# Patient Record
Sex: FEMALE | Race: WHITE | URBAN - NONMETROPOLITAN AREA
[De-identification: names, ages, dates, MRNs, and addresses within clinical notes are randomized per-mention and may not be internally consistent; named-entity substitution may affect disease eponyms.]

---

## 2021-04-05 ENCOUNTER — OFFICE VISIT (OUTPATIENT)
Dept: URBAN - NONMETROPOLITAN AREA CLINIC 10 | Facility: CLINIC | Age: 51
End: 2021-04-05
Payer: COMMERCIAL

## 2021-04-05 DIAGNOSIS — H43.813 VITREOUS DEGENERATION, BILATERAL: Primary | ICD-10-CM

## 2021-04-05 DIAGNOSIS — H04.123 DRY EYE SYNDROME OF BILATERAL LACRIMAL GLANDS: ICD-10-CM

## 2021-04-05 DIAGNOSIS — H52.4 PRESBYOPIA: ICD-10-CM

## 2021-04-05 DIAGNOSIS — H40.013 OPEN ANGLE WITH BORDERLINE FINDINGS, LOW RISK, BILATERAL: ICD-10-CM

## 2021-04-05 PROCEDURE — 92004 COMPRE OPH EXAM NEW PT 1/>: CPT | Performed by: OPTOMETRIST

## 2021-04-05 ASSESSMENT — VISUAL ACUITY
OS: 20/30
OD: 20/30

## 2021-04-05 ASSESSMENT — INTRAOCULAR PRESSURE
OS: 17
OD: 15

## 2021-04-05 NOTE — IMPRESSION/PLAN
Impression: Diagnosis: Dry eye syndrome of bilateral lacrimal glands. Code: N41.728. Plan: Dry eyes account for the patient's complaints. There is no evidence of permanent changes to the cornea. Explained condition does not have a cure and will need artificial tears for maintenance. Recommend Systane Complete.

## 2021-04-05 NOTE — IMPRESSION/PLAN
Impression: Presbyopia: H52.4. Plan: Diagnosis discussed. SRx released, pt edu that a temporary adjustment is expected. Discussed SV vs MF lenses.